# Patient Record
Sex: MALE | Race: WHITE | NOT HISPANIC OR LATINO | URBAN - NONMETROPOLITAN AREA
[De-identification: names, ages, dates, MRNs, and addresses within clinical notes are randomized per-mention and may not be internally consistent; named-entity substitution may affect disease eponyms.]

---

## 2017-02-22 ENCOUNTER — APPOINTMENT (OUTPATIENT)
Age: 32
Setting detail: DERMATOLOGY
End: 2017-02-22

## 2017-02-22 DIAGNOSIS — L28.0 LICHEN SIMPLEX CHRONICUS: ICD-10-CM

## 2017-02-22 PROBLEM — L30.9 DERMATITIS, UNSPECIFIED: Status: ACTIVE | Noted: 2017-02-22

## 2017-02-22 PROBLEM — J45.909 UNSPECIFIED ASTHMA, UNCOMPLICATED: Status: ACTIVE | Noted: 2017-02-22

## 2017-02-22 PROBLEM — F32.9 MAJOR DEPRESSIVE DISORDER, SINGLE EPISODE, UNSPECIFIED: Status: ACTIVE | Noted: 2017-02-22

## 2017-02-22 PROCEDURE — 99203 OFFICE O/P NEW LOW 30 MIN: CPT

## 2017-02-22 PROCEDURE — OTHER TREATMENT REGIMEN: OTHER

## 2017-02-22 PROCEDURE — OTHER COUNSELING: OTHER

## 2017-02-22 PROCEDURE — OTHER PRESCRIPTION: OTHER

## 2017-02-22 PROCEDURE — OTHER OTHER: OTHER

## 2017-02-22 RX ORDER — CLOBETASOL PROPIONATE 0.5 MG/G
OINTMENT TOPICAL
Qty: 1 | Refills: 0 | Status: ERX | COMMUNITY
Start: 2017-02-22

## 2017-02-22 ASSESSMENT — LOCATION DETAILED DESCRIPTION DERM: LOCATION DETAILED: RIGHT LATERAL DORSAL FOOT

## 2017-02-22 ASSESSMENT — LOCATION ZONE DERM: LOCATION ZONE: FEET

## 2017-02-22 ASSESSMENT — LOCATION SIMPLE DESCRIPTION DERM: LOCATION SIMPLE: RIGHT FOOT

## 2017-02-22 NOTE — PROCEDURE: TREATMENT REGIMEN
Initiate Regimen: Clobetasol 0.05% topical ointment 2-3 times daily to affected area on foot for up to 3 weeks. Then use twice daily on weekends only. Do not use on face, armpits or groin Start Regimen: Clobetasol 0.05% topical ointment 2-3 times daily to affected area on foot for up to 3 weeks. Then use twice daily on weekends only. Do not use on face, armpits or groin

## 2017-02-22 NOTE — PROCEDURE: TREATMENT REGIMEN
Otc Regimen: Avoid scratching as friction can aggravate condition.\\nAdvise leaving open to the air, if itching is a problem at night, try using a sock

## 2017-02-22 NOTE — PROCEDURE: OTHER
Detail Level: Simple
Note Text (......Xxx Chief Complaint.): This diagnosis correlates with the
Other (Free Text): I discussed it is difficult to determine if this is a solitary patch of psoriasis or LSC, but treatment would not be affected by a specific diagnosis. I reassured him that he has no other findings of psoriasis.